# Patient Record
Sex: MALE | Race: ASIAN
[De-identification: names, ages, dates, MRNs, and addresses within clinical notes are randomized per-mention and may not be internally consistent; named-entity substitution may affect disease eponyms.]

---

## 2023-04-07 ENCOUNTER — HOSPITAL ENCOUNTER (EMERGENCY)
Dept: HOSPITAL 76 - ED | Age: 48
LOS: 1 days | Discharge: HOME | End: 2023-04-08
Payer: COMMERCIAL

## 2023-04-07 ENCOUNTER — HOSPITAL ENCOUNTER (OUTPATIENT)
Dept: HOSPITAL 76 - EMS | Age: 48
End: 2023-04-07
Payer: COMMERCIAL

## 2023-04-07 DIAGNOSIS — R11.2: Primary | ICD-10-CM

## 2023-04-07 DIAGNOSIS — F41.9: Primary | ICD-10-CM

## 2023-04-07 LAB
ALBUMIN DIAFP-MCNC: 4.5 G/DL (ref 3.2–5.5)
ALBUMIN/GLOB SERPL: 1.5 {RATIO} (ref 1–2.2)
ALP SERPL-CCNC: 47 IU/L (ref 42–121)
ALT SERPL W P-5'-P-CCNC: 26 IU/L (ref 10–60)
ANION GAP SERPL CALCULATED.4IONS-SCNC: 8 MMOL/L (ref 6–13)
AST SERPL W P-5'-P-CCNC: 22 IU/L (ref 10–42)
BASOPHILS NFR BLD AUTO: 0 10^3/UL (ref 0–0.1)
BASOPHILS NFR BLD AUTO: 0.3 %
BILIRUB BLD-MCNC: 1.8 MG/DL (ref 0.2–1)
BUN SERPL-MCNC: 21 MG/DL (ref 6–20)
CALCIUM UR-MCNC: 9.3 MG/DL (ref 8.5–10.3)
CHLORIDE SERPL-SCNC: 107 MMOL/L (ref 101–111)
CO2 SERPL-SCNC: 25 MMOL/L (ref 21–32)
CREAT SERPLBLD-SCNC: 1 MG/DL (ref 0.6–1.2)
EOSINOPHIL # BLD AUTO: 0 10^3/UL (ref 0–0.7)
EOSINOPHIL NFR BLD AUTO: 0.3 %
ERYTHROCYTE [DISTWIDTH] IN BLOOD BY AUTOMATED COUNT: 12.6 % (ref 12–15)
GFRSERPLBLD MDRD-ARVRAT: 80 ML/MIN/{1.73_M2} (ref 89–?)
GLOBULIN SER-MCNC: 3 G/DL (ref 2.1–4.2)
GLUCOSE SERPL-MCNC: 117 MG/DL (ref 70–100)
HCT VFR BLD AUTO: 45.5 % (ref 42–52)
HGB UR QL STRIP: 15.2 G/DL (ref 14–18)
LIPASE SERPL-CCNC: 41 U/L (ref 22–51)
LYMPHOCYTES # SPEC AUTO: 0.8 10^3/UL (ref 1.5–3.5)
LYMPHOCYTES NFR BLD AUTO: 8.6 %
MCH RBC QN AUTO: 26.3 PG (ref 27–31)
MCHC RBC AUTO-ENTMCNC: 33.4 G/DL (ref 32–36)
MCV RBC AUTO: 78.9 FL (ref 80–94)
MONOCYTES # BLD AUTO: 0.8 10^3/UL (ref 0–1)
MONOCYTES NFR BLD AUTO: 8 %
NEUTROPHILS # BLD AUTO: 8 10^3/UL (ref 1.5–6.6)
NEUTROPHILS # SNV AUTO: 9.7 X10^3/UL (ref 4.8–10.8)
NEUTROPHILS NFR BLD AUTO: 82.4 %
NRBC # BLD AUTO: 0 /100WBC
NRBC # BLD AUTO: 0 X10^3/UL
PDW BLD AUTO: 11.3 FL (ref 7.4–11.4)
PLATELET # BLD: 243 10^3/UL (ref 130–450)
POTASSIUM SERPL-SCNC: 3.8 MMOL/L (ref 3.5–5)
PROT SPEC-MCNC: 7.5 G/DL (ref 6.7–8.2)
RBC MAR: 5.77 10^6/UL (ref 4.7–6.1)
SODIUM SERPLBLD-SCNC: 140 MMOL/L (ref 135–145)

## 2023-04-07 PROCEDURE — 85025 COMPLETE CBC W/AUTO DIFF WBC: CPT

## 2023-04-07 PROCEDURE — 83690 ASSAY OF LIPASE: CPT

## 2023-04-07 PROCEDURE — 80053 COMPREHEN METABOLIC PANEL: CPT

## 2023-04-07 PROCEDURE — 83605 ASSAY OF LACTIC ACID: CPT

## 2023-04-07 PROCEDURE — 36415 COLL VENOUS BLD VENIPUNCTURE: CPT

## 2023-04-07 PROCEDURE — 99283 EMERGENCY DEPT VISIT LOW MDM: CPT

## 2023-04-07 NOTE — ED PHYSICIAN DOCUMENTATION
History of Present Illness





- Stated complaint


Stated Complaint: V/N/BLURRED VISION





- Chief complaint


Chief Complaint: Abd Pain





- History obtained from


History obtained from: Patient, Family





- History of Present Illness


Timing: Today





- Additonal information


Additional information: 





48-year-old Johnathan Shelley was on his way home from work today for the 

[place] of nausea and 

vomiting and rapid breathing.  He relates a history of anxiety associated with 

his job and with his living situation.  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 

. His truck has recently broken down.  He usually works 5 days a week and 

stays in [place] and his mother's apartment and comes home on the weekends to 

his fiance.  He states that he used to deal with his anxiety with use of 

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 

development that his 20-year-old son has begun to live with he and his mother in

the apartment.  The sons girlfriend has been staying there as well and they are 

getting ready to move him into an apartment next-door. Thge patient feels that 

that is not going to go well.





Review of Systems


Constitutional: denies: Fever


Eyes: denies: Decreased vision


Ears: denies: Ear pain


Nose: denies: Rhinorrhea / runny nose, Congestion


Throat: denies: Sore throat


Cardiac: reports: Palpitations.  denies: Chest pain / pressure


Respiratory: denies: Dyspnea, Cough


GI: reports: Nausea, Vomiting.  denies: Abdominal Pain


: denies: Dysuria, Frequency


Skin: denies: Rash


Musculoskeletal: denies: Neck pain, Back pain, Extremity pain


Neurologic: reports: Numbness, Headache.  denies: Generalized weakness, Focal 

weakness, Difficulty speaking, Head injury, LOC





PD PAST MEDICAL HISTORY





- Present Medications


Home Medications: 


                                Ambulatory Orders











 Medication  Instructions  Recorded  Confirmed


 


LORazepam [Ativan] 1 mg PO Q6HR PRN #12 tablet 04/08/23 














- Allergies


Allergies/Adverse Reactions: 


                                    Allergies











Allergy/AdvReac Type Severity Reaction Status Date / Time


 


Penicillins Allergy  Unknown Verified 04/07/23 21:53














PD ED PE NORMAL





- Vitals


Vital signs reviewed: Yes (hypertensive )





- General


General: Alert and oriented X 3, Well developed/nourished, Other (anxious 

appearing )





- HEENT


HEENT: Atraumatic, PERRL, EOMI





- Neck


Neck: Supple, no meningeal sign, No bony TTP





- Cardiac


Cardiac: RRR, No murmur





- Respiratory


Respiratory: No respiratory distress, Clear bilaterally





- Abdomen


Abdomen: Soft, Non tender





- Back


Back: No CVA TTP, No spinal TTP





- Derm


Derm: Normal color, Warm and dry, No rash





- Extremities


Extremities: No deformity, No edema





- Neuro


Neuro: Alert and oriented X 3, CNs 2-12 intact, No motor deficit, No sensory 

deficit, Normal speech


Eye Opening: Spontaneous


Motor: Obeys Commands


Verbal: Oriented


GCS Score: 15





- Psych


Psych: Normal mood, Normal affect





Results





- Vitals


Vitals: 


                               Vital Signs - 24 hr











  04/07/23 04/07/23 04/08/23





  21:50 23:50 00:22


 


Temperature 36.6 C  


 


Heart Rate 85 94 68


 


Respiratory 16 18 18





Rate   


 


Blood Pressure 150/94 H 144/92 H 138/67 H


 


O2 Saturation 94 99 99








                                     Oxygen











O2 Source                      Room air

















- Labs


Labs: 


                                Laboratory Tests











  04/07/23 04/07/23 04/07/23





  22:57 22:57 22:57


 


WBC  9.7  


 


RBC  5.77  


 


Hgb  15.2  


 


Hct  45.5  


 


MCV  78.9 L  


 


MCH  26.3 L  


 


MCHC  33.4  


 


RDW  12.6  


 


Plt Count  243  


 


MPV  11.3  


 


Neut # (Auto)  8.0 H  


 


Lymph # (Auto)  0.8 L  


 


Mono # (Auto)  0.8  


 


Eos # (Auto)  0.0  


 


Baso # (Auto)  0.0  


 


Absolute Nucleated RBC  0.00  


 


Nucleated RBC %  0.0  


 


Sodium   140 


 


Potassium   3.8 


 


Chloride   107 


 


Carbon Dioxide   25 


 


Anion Gap   8.0 


 


BUN   21 H 


 


Creatinine   1.0 


 


Estimated GFR (MDRD)   80 L 


 


Glucose   117 H 


 


Lactic Acid    0.9


 


Calcium   9.3 


 


Total Bilirubin   1.8 H 


 


AST   22 


 


ALT   26 


 


Alkaline Phosphatase   47 


 


Total Protein   7.5 


 


Albumin   4.5 


 


Globulin   3.0 


 


Albumin/Globulin Ratio   1.5 


 


Lipase   41 














PD Medical Decision Making





- ED course


Complexity details: reviewed results, re-evaluated patient, considered 

differential, d/w patient, d/w family


Reviewed Lab Results: 





We evaluated a white blood cell count hemoglobin hematocrit and platelets which 

were all normal.  We evaluated chemistry profile showing normal electrolytes a 

mildly elevated BUN normal creatinine normal liver function normal lipase.  My 

interpretation of these laboratory studies are benign findings.


ED course: 





48-year-old male presents with signs and symptoms consistent with a panic attack

 and on physical exam has no remarkable findings.  Blood work is otherwise 

unremarkable.  The patient was administered Ativan here in the emergency 

department with marked improvement in his symptomatology.  I discussed with the 

patient and his fiance treatment of anxiety and we will provide a brief course 

of Ativan and the patient will follow-up with his primary care doctor in 

[place] regarding need for further medication and counseling.





Departure





- Departure


Disposition: 01 Home, Self Care


Clinical Impression: 


 Anxiety





Condition: Stable


Instructions:  ED Panic Attack


Follow-Up: 


Your, doctor [Other]


Prescriptions: 


LORazepam [Ativan] 1 mg PO Q6HR PRN #12 tablet


 PRN Reason: Anxiety


Comments: 


Johnathan today it looks like the symptoms you are having are related to 

anxiety and we have given you medication to counteract that.  This medication 

cannot be taken on a regular basis and is intended for use on an as-needed 

basis.  My recommendation is to follow-up with your primary care doctor about 

alternative medications to use for anxiety as they are usually more effective 

use long-term and do not have issues with tolerance and dependence. The 

lorazepam has been E scribed to the [facility] in [place]


Discharge Date/Time: 04/08/23 00:23

## 2023-04-08 VITALS — DIASTOLIC BLOOD PRESSURE: 67 MMHG | SYSTOLIC BLOOD PRESSURE: 138 MMHG
